# Patient Record
Sex: FEMALE | Race: WHITE | NOT HISPANIC OR LATINO | Employment: OTHER | URBAN - METROPOLITAN AREA
[De-identification: names, ages, dates, MRNs, and addresses within clinical notes are randomized per-mention and may not be internally consistent; named-entity substitution may affect disease eponyms.]

---

## 2017-08-30 ENCOUNTER — HOSPITAL ENCOUNTER (OUTPATIENT)
Facility: MEDICAL CENTER | Age: 82
End: 2017-08-31
Attending: EMERGENCY MEDICINE | Admitting: INTERNAL MEDICINE
Payer: MEDICARE

## 2017-08-30 ENCOUNTER — OFFICE VISIT (OUTPATIENT)
Dept: URGENT CARE | Facility: CLINIC | Age: 82
End: 2017-08-30
Payer: MEDICARE

## 2017-08-30 ENCOUNTER — APPOINTMENT (OUTPATIENT)
Dept: RADIOLOGY | Facility: MEDICAL CENTER | Age: 82
End: 2017-08-30
Attending: EMERGENCY MEDICINE
Payer: MEDICARE

## 2017-08-30 ENCOUNTER — RESOLUTE PROFESSIONAL BILLING HOSPITAL PROF FEE (OUTPATIENT)
Dept: HOSPITALIST | Facility: MEDICAL CENTER | Age: 82
End: 2017-08-30
Payer: MEDICARE

## 2017-08-30 VITALS
DIASTOLIC BLOOD PRESSURE: 84 MMHG | RESPIRATION RATE: 18 BRPM | TEMPERATURE: 98.6 F | OXYGEN SATURATION: 98 % | WEIGHT: 143 LBS | SYSTOLIC BLOOD PRESSURE: 162 MMHG | BODY MASS INDEX: 25.33 KG/M2 | HEART RATE: 76 BPM

## 2017-08-30 DIAGNOSIS — R07.89 CHEST DISCOMFORT: ICD-10-CM

## 2017-08-30 PROBLEM — R07.9 CHEST PAIN: Status: ACTIVE | Noted: 2017-08-30

## 2017-08-30 LAB
ALBUMIN SERPL BCP-MCNC: 4.3 G/DL (ref 3.2–4.9)
ALBUMIN/GLOB SERPL: 1.5 G/DL
ALP SERPL-CCNC: 64 U/L (ref 30–99)
ALT SERPL-CCNC: 17 U/L (ref 2–50)
ANION GAP SERPL CALC-SCNC: 9 MMOL/L (ref 0–11.9)
APTT PPP: 30.5 SEC (ref 24.7–36)
AST SERPL-CCNC: 33 U/L (ref 12–45)
BASOPHILS # BLD AUTO: 0.4 % (ref 0–1.8)
BASOPHILS # BLD: 0.05 K/UL (ref 0–0.12)
BILIRUB SERPL-MCNC: 0.4 MG/DL (ref 0.1–1.5)
BNP SERPL-MCNC: 101 PG/ML (ref 0–100)
BUN SERPL-MCNC: 20 MG/DL (ref 8–22)
CALCIUM SERPL-MCNC: 9.6 MG/DL (ref 8.5–10.5)
CHLORIDE SERPL-SCNC: 109 MMOL/L (ref 96–112)
CO2 SERPL-SCNC: 24 MMOL/L (ref 20–33)
CREAT SERPL-MCNC: 0.95 MG/DL (ref 0.5–1.4)
EKG IMPRESSION: NORMAL
EOSINOPHIL # BLD AUTO: 0.22 K/UL (ref 0–0.51)
EOSINOPHIL NFR BLD: 2 % (ref 0–6.9)
ERYTHROCYTE [DISTWIDTH] IN BLOOD BY AUTOMATED COUNT: 43.7 FL (ref 35.9–50)
GFR SERPL CREATININE-BSD FRML MDRD: 57 ML/MIN/1.73 M 2
GLOBULIN SER CALC-MCNC: 2.9 G/DL (ref 1.9–3.5)
GLUCOSE SERPL-MCNC: 104 MG/DL (ref 65–99)
HCT VFR BLD AUTO: 46.3 % (ref 37–47)
HGB BLD-MCNC: 15.8 G/DL (ref 12–16)
IMM GRANULOCYTES # BLD AUTO: 0.04 K/UL (ref 0–0.11)
IMM GRANULOCYTES NFR BLD AUTO: 0.4 % (ref 0–0.9)
INR PPP: 1 (ref 0.87–1.13)
LIPASE SERPL-CCNC: 365 U/L (ref 11–82)
LYMPHOCYTES # BLD AUTO: 2.89 K/UL (ref 1–4.8)
LYMPHOCYTES NFR BLD: 25.8 % (ref 22–41)
MCH RBC QN AUTO: 31.2 PG (ref 27–33)
MCHC RBC AUTO-ENTMCNC: 34.1 G/DL (ref 33.6–35)
MCV RBC AUTO: 91.3 FL (ref 81.4–97.8)
MONOCYTES # BLD AUTO: 0.9 K/UL (ref 0–0.85)
MONOCYTES NFR BLD AUTO: 8.1 % (ref 0–13.4)
NEUTROPHILS # BLD AUTO: 7.08 K/UL (ref 2–7.15)
NEUTROPHILS NFR BLD: 63.3 % (ref 44–72)
NRBC # BLD AUTO: 0 K/UL
NRBC BLD AUTO-RTO: 0 /100 WBC
PLATELET # BLD AUTO: 196 K/UL (ref 164–446)
PMV BLD AUTO: 9.2 FL (ref 9–12.9)
POTASSIUM SERPL-SCNC: 4.1 MMOL/L (ref 3.6–5.5)
PROT SERPL-MCNC: 7.2 G/DL (ref 6–8.2)
PROTHROMBIN TIME: 13.5 SEC (ref 12–14.6)
RBC # BLD AUTO: 5.07 M/UL (ref 4.2–5.4)
SODIUM SERPL-SCNC: 142 MMOL/L (ref 135–145)
TROPONIN I SERPL-MCNC: <0.01 NG/ML (ref 0–0.04)
WBC # BLD AUTO: 11.2 K/UL (ref 4.8–10.8)

## 2017-08-30 PROCEDURE — 71010 DX-CHEST-LIMITED (1 VIEW): CPT

## 2017-08-30 PROCEDURE — 93005 ELECTROCARDIOGRAM TRACING: CPT | Performed by: EMERGENCY MEDICINE

## 2017-08-30 PROCEDURE — 85025 COMPLETE CBC W/AUTO DIFF WBC: CPT

## 2017-08-30 PROCEDURE — 85610 PROTHROMBIN TIME: CPT

## 2017-08-30 PROCEDURE — 36415 COLL VENOUS BLD VENIPUNCTURE: CPT

## 2017-08-30 PROCEDURE — 93005 ELECTROCARDIOGRAM TRACING: CPT

## 2017-08-30 PROCEDURE — 83880 ASSAY OF NATRIURETIC PEPTIDE: CPT

## 2017-08-30 PROCEDURE — 99285 EMERGENCY DEPT VISIT HI MDM: CPT

## 2017-08-30 PROCEDURE — 85730 THROMBOPLASTIN TIME PARTIAL: CPT

## 2017-08-30 PROCEDURE — 99203 OFFICE O/P NEW LOW 30 MIN: CPT | Mod: 25 | Performed by: FAMILY MEDICINE

## 2017-08-30 PROCEDURE — G0378 HOSPITAL OBSERVATION PER HR: HCPCS

## 2017-08-30 PROCEDURE — 84484 ASSAY OF TROPONIN QUANT: CPT

## 2017-08-30 PROCEDURE — 93000 ELECTROCARDIOGRAM COMPLETE: CPT | Performed by: FAMILY MEDICINE

## 2017-08-30 PROCEDURE — 83690 ASSAY OF LIPASE: CPT

## 2017-08-30 PROCEDURE — 99219 PR INITIAL OBSERVATION CARE,LEVL II: CPT | Mod: GC | Performed by: INTERNAL MEDICINE

## 2017-08-30 PROCEDURE — 80053 COMPREHEN METABOLIC PANEL: CPT

## 2017-08-30 RX ORDER — SODIUM CHLORIDE 9 MG/ML
INJECTION, SOLUTION INTRAVENOUS CONTINUOUS
Status: DISCONTINUED | OUTPATIENT
Start: 2017-08-31 | End: 2017-08-31 | Stop reason: HOSPADM

## 2017-08-30 RX ORDER — POLYETHYLENE GLYCOL 3350 17 G/17G
1 POWDER, FOR SOLUTION ORAL
Status: DISCONTINUED | OUTPATIENT
Start: 2017-08-30 | End: 2017-08-31 | Stop reason: HOSPADM

## 2017-08-30 RX ORDER — LABETALOL HYDROCHLORIDE 5 MG/ML
10 INJECTION, SOLUTION INTRAVENOUS EVERY 4 HOURS PRN
Status: DISCONTINUED | OUTPATIENT
Start: 2017-08-30 | End: 2017-08-31 | Stop reason: HOSPADM

## 2017-08-30 RX ORDER — ATORVASTATIN CALCIUM 40 MG/1
40 TABLET, FILM COATED ORAL
Status: DISCONTINUED | OUTPATIENT
Start: 2017-08-31 | End: 2017-08-31 | Stop reason: HOSPADM

## 2017-08-30 RX ORDER — CYCLOSPORINE 0.5 MG/ML
EMULSION OPHTHALMIC
COMMUNITY
Start: 2017-07-27 | End: 2017-08-30

## 2017-08-30 RX ORDER — DEXTROSE MONOHYDRATE 25 G/50ML
25 INJECTION, SOLUTION INTRAVENOUS
Status: DISCONTINUED | OUTPATIENT
Start: 2017-08-30 | End: 2017-08-31 | Stop reason: HOSPADM

## 2017-08-30 RX ORDER — BISACODYL 10 MG
10 SUPPOSITORY, RECTAL RECTAL
Status: DISCONTINUED | OUTPATIENT
Start: 2017-08-30 | End: 2017-08-31 | Stop reason: HOSPADM

## 2017-08-30 RX ORDER — ASPIRIN 81 MG/1
324 TABLET, CHEWABLE ORAL ONCE
Status: COMPLETED | OUTPATIENT
Start: 2017-08-30 | End: 2017-08-30

## 2017-08-30 RX ORDER — HEPARIN SODIUM 5000 [USP'U]/ML
5000 INJECTION, SOLUTION INTRAVENOUS; SUBCUTANEOUS EVERY 8 HOURS
Status: DISCONTINUED | OUTPATIENT
Start: 2017-08-31 | End: 2017-08-31 | Stop reason: HOSPADM

## 2017-08-30 RX ORDER — ACETAMINOPHEN 325 MG/1
650 TABLET ORAL EVERY 6 HOURS PRN
Status: DISCONTINUED | OUTPATIENT
Start: 2017-08-30 | End: 2017-08-31 | Stop reason: HOSPADM

## 2017-08-30 RX ORDER — AMOXICILLIN 250 MG
2 CAPSULE ORAL 2 TIMES DAILY
Status: DISCONTINUED | OUTPATIENT
Start: 2017-08-31 | End: 2017-08-31 | Stop reason: HOSPADM

## 2017-08-30 RX ORDER — ATENOLOL 50 MG/1
50 TABLET ORAL
COMMUNITY
End: 2017-10-03 | Stop reason: SDUPTHER

## 2017-08-30 RX ADMIN — ASPIRIN 324 MG: 81 TABLET, CHEWABLE ORAL at 18:04

## 2017-08-30 ASSESSMENT — ENCOUNTER SYMPTOMS
FEVER: 0
FOCAL WEAKNESS: 0
BACK PAIN: 1
DIZZINESS: 0
CHILLS: 0
DIARRHEA: 1

## 2017-08-30 ASSESSMENT — PAIN SCALES - GENERAL: PAINLEVEL_OUTOF10: 1

## 2017-08-30 ASSESSMENT — LIFESTYLE VARIABLES: DO YOU DRINK ALCOHOL: NO

## 2017-08-31 ENCOUNTER — APPOINTMENT (OUTPATIENT)
Dept: RADIOLOGY | Facility: MEDICAL CENTER | Age: 82
End: 2017-08-31
Attending: STUDENT IN AN ORGANIZED HEALTH CARE EDUCATION/TRAINING PROGRAM
Payer: MEDICARE

## 2017-08-31 VITALS
HEART RATE: 62 BPM | BODY MASS INDEX: 25.41 KG/M2 | SYSTOLIC BLOOD PRESSURE: 166 MMHG | DIASTOLIC BLOOD PRESSURE: 75 MMHG | HEIGHT: 64 IN | WEIGHT: 148.81 LBS | OXYGEN SATURATION: 96 % | RESPIRATION RATE: 17 BRPM | TEMPERATURE: 98.4 F

## 2017-08-31 PROBLEM — R19.7 DIARRHEA: Status: ACTIVE | Noted: 2017-08-31

## 2017-08-31 PROBLEM — R74.8 ELEVATED LIPASE: Status: ACTIVE | Noted: 2017-08-31

## 2017-08-31 PROBLEM — N18.9 CHRONIC KIDNEY DISEASE: Status: ACTIVE | Noted: 2017-08-31

## 2017-08-31 LAB
ANION GAP SERPL CALC-SCNC: 9 MMOL/L (ref 0–11.9)
BUN SERPL-MCNC: 20 MG/DL (ref 8–22)
CALCIUM SERPL-MCNC: 9.4 MG/DL (ref 8.5–10.5)
CHLORIDE SERPL-SCNC: 108 MMOL/L (ref 96–112)
CHOLEST SERPL-MCNC: 192 MG/DL (ref 100–199)
CO2 SERPL-SCNC: 23 MMOL/L (ref 20–33)
CREAT SERPL-MCNC: 0.91 MG/DL (ref 0.5–1.4)
EKG IMPRESSION: NORMAL
EST. AVERAGE GLUCOSE BLD GHB EST-MCNC: 120 MG/DL
GFR SERPL CREATININE-BSD FRML MDRD: 59 ML/MIN/1.73 M 2
GLUCOSE SERPL-MCNC: 103 MG/DL (ref 65–99)
HBA1C MFR BLD: 5.8 % (ref 0–5.6)
HDLC SERPL-MCNC: 46 MG/DL
LDLC SERPL CALC-MCNC: 116 MG/DL
LIPASE SERPL-CCNC: 68 U/L (ref 11–82)
MAGNESIUM SERPL-MCNC: 1.9 MG/DL (ref 1.5–2.5)
POTASSIUM SERPL-SCNC: 4.1 MMOL/L (ref 3.6–5.5)
SODIUM SERPL-SCNC: 140 MMOL/L (ref 135–145)
TRIGL SERPL-MCNC: 152 MG/DL (ref 0–149)
TROPONIN I SERPL-MCNC: <0.01 NG/ML (ref 0–0.04)
TROPONIN I SERPL-MCNC: <0.01 NG/ML (ref 0–0.04)
TSH SERPL DL<=0.005 MIU/L-ACNC: 3.55 UIU/ML (ref 0.3–3.7)

## 2017-08-31 PROCEDURE — G0378 HOSPITAL OBSERVATION PER HR: HCPCS

## 2017-08-31 PROCEDURE — 93005 ELECTROCARDIOGRAM TRACING: CPT | Performed by: INTERNAL MEDICINE

## 2017-08-31 PROCEDURE — 700105 HCHG RX REV CODE 258: Performed by: STUDENT IN AN ORGANIZED HEALTH CARE EDUCATION/TRAINING PROGRAM

## 2017-08-31 PROCEDURE — 84443 ASSAY THYROID STIM HORMONE: CPT

## 2017-08-31 PROCEDURE — 83735 ASSAY OF MAGNESIUM: CPT

## 2017-08-31 PROCEDURE — 700102 HCHG RX REV CODE 250 W/ 637 OVERRIDE(OP): Performed by: INTERNAL MEDICINE

## 2017-08-31 PROCEDURE — 84484 ASSAY OF TROPONIN QUANT: CPT

## 2017-08-31 PROCEDURE — 36415 COLL VENOUS BLD VENIPUNCTURE: CPT

## 2017-08-31 PROCEDURE — A9270 NON-COVERED ITEM OR SERVICE: HCPCS | Performed by: STUDENT IN AN ORGANIZED HEALTH CARE EDUCATION/TRAINING PROGRAM

## 2017-08-31 PROCEDURE — A9270 NON-COVERED ITEM OR SERVICE: HCPCS | Performed by: INTERNAL MEDICINE

## 2017-08-31 PROCEDURE — 700111 HCHG RX REV CODE 636 W/ 250 OVERRIDE (IP)

## 2017-08-31 PROCEDURE — 700111 HCHG RX REV CODE 636 W/ 250 OVERRIDE (IP): Performed by: STUDENT IN AN ORGANIZED HEALTH CARE EDUCATION/TRAINING PROGRAM

## 2017-08-31 PROCEDURE — 99217 PR OBSERVATION CARE DISCHARGE: CPT | Mod: GC | Performed by: INTERNAL MEDICINE

## 2017-08-31 PROCEDURE — 93010 ELECTROCARDIOGRAM REPORT: CPT | Performed by: INTERNAL MEDICINE

## 2017-08-31 PROCEDURE — 80061 LIPID PANEL: CPT

## 2017-08-31 PROCEDURE — A9502 TC99M TETROFOSMIN: HCPCS

## 2017-08-31 PROCEDURE — 93005 ELECTROCARDIOGRAM TRACING: CPT | Performed by: STUDENT IN AN ORGANIZED HEALTH CARE EDUCATION/TRAINING PROGRAM

## 2017-08-31 PROCEDURE — 83690 ASSAY OF LIPASE: CPT

## 2017-08-31 PROCEDURE — 83036 HEMOGLOBIN GLYCOSYLATED A1C: CPT

## 2017-08-31 PROCEDURE — 80048 BASIC METABOLIC PNL TOTAL CA: CPT

## 2017-08-31 PROCEDURE — 94760 N-INVAS EAR/PLS OXIMETRY 1: CPT

## 2017-08-31 PROCEDURE — 96372 THER/PROPH/DIAG INJ SC/IM: CPT | Mod: XU

## 2017-08-31 PROCEDURE — 700102 HCHG RX REV CODE 250 W/ 637 OVERRIDE(OP): Performed by: STUDENT IN AN ORGANIZED HEALTH CARE EDUCATION/TRAINING PROGRAM

## 2017-08-31 RX ORDER — REGADENOSON 0.08 MG/ML
INJECTION, SOLUTION INTRAVENOUS
Status: COMPLETED
Start: 2017-08-31 | End: 2017-08-31

## 2017-08-31 RX ORDER — CEPHALEXIN 500 MG/1
500 CAPSULE ORAL DAILY
Status: DISCONTINUED | OUTPATIENT
Start: 2017-08-31 | End: 2017-08-31 | Stop reason: HOSPADM

## 2017-08-31 RX ORDER — OMEPRAZOLE 20 MG/1
20 CAPSULE, DELAYED RELEASE ORAL
Status: DISCONTINUED | OUTPATIENT
Start: 2017-08-31 | End: 2017-08-31 | Stop reason: HOSPADM

## 2017-08-31 RX ORDER — ATORVASTATIN CALCIUM 40 MG/1
40 TABLET, FILM COATED ORAL
Qty: 30 TAB | Refills: 0 | Status: SHIPPED | OUTPATIENT
Start: 2017-08-31 | End: 2017-09-06

## 2017-08-31 RX ORDER — ATENOLOL 50 MG/1
50 TABLET ORAL
Status: DISCONTINUED | OUTPATIENT
Start: 2017-08-31 | End: 2017-08-31 | Stop reason: HOSPADM

## 2017-08-31 RX ORDER — ASPIRIN 81 MG/1
81 TABLET ORAL DAILY
Qty: 30 TAB | Refills: 0 | Status: SHIPPED | OUTPATIENT
Start: 2017-08-31 | End: 2023-07-10

## 2017-08-31 RX ORDER — L. ACIDOPHILUS/L.BULGARICUS 100MM CELL
1 GRANULES IN PACKET (EA) ORAL
Status: DISCONTINUED | OUTPATIENT
Start: 2017-08-31 | End: 2017-08-31 | Stop reason: HOSPADM

## 2017-08-31 RX ADMIN — HEPARIN SODIUM 5000 UNITS: 5000 INJECTION, SOLUTION INTRAVENOUS; SUBCUTANEOUS at 06:33

## 2017-08-31 RX ADMIN — SODIUM CHLORIDE: 9 INJECTION, SOLUTION INTRAVENOUS at 02:44

## 2017-08-31 RX ADMIN — REGADENOSON 0.4 MG: 0.08 INJECTION, SOLUTION INTRAVENOUS at 09:24

## 2017-08-31 RX ADMIN — HEPARIN SODIUM 5000 UNITS: 5000 INJECTION, SOLUTION INTRAVENOUS; SUBCUTANEOUS at 03:06

## 2017-08-31 RX ADMIN — ATORVASTATIN CALCIUM 40 MG: 40 TABLET, FILM COATED ORAL at 03:06

## 2017-08-31 RX ADMIN — ATENOLOL 50 MG: 50 TABLET ORAL at 03:06

## 2017-08-31 RX ADMIN — CEPHALEXIN 500 MG: 500 CAPSULE ORAL at 10:50

## 2017-08-31 RX ADMIN — OMEPRAZOLE 20 MG: 20 CAPSULE, DELAYED RELEASE ORAL at 06:32

## 2017-08-31 ASSESSMENT — ENCOUNTER SYMPTOMS
FEVER: 0
DIAPHORESIS: 0
HEARTBURN: 0
PND: 0
FOCAL WEAKNESS: 0
SHORTNESS OF BREATH: 0
CHILLS: 0
NAUSEA: 0
ORTHOPNEA: 0
BLOOD IN STOOL: 0
CONSTIPATION: 0
ABDOMINAL PAIN: 0
VOMITING: 0
HEADACHES: 0
WEIGHT LOSS: 1
BACK PAIN: 1
TREMORS: 0
TINGLING: 0
NECK PAIN: 1
SORE THROAT: 0
WHEEZING: 0
BLURRED VISION: 0
NERVOUS/ANXIOUS: 0
COUGH: 0
DIARRHEA: 0
BRUISES/BLEEDS EASILY: 0
DIZZINESS: 0
WEAKNESS: 0
PALPITATIONS: 0

## 2017-08-31 ASSESSMENT — LIFESTYLE VARIABLES
EVER HAD A DRINK FIRST THING IN THE MORNING TO STEADY YOUR NERVES TO GET RID OF A HANGOVER: NO
HOW MANY TIMES IN THE PAST YEAR HAVE YOU HAD 5 OR MORE DRINKS IN A DAY: 0
TOTAL SCORE: 0
CONSUMPTION TOTAL: NEGATIVE
AVERAGE NUMBER OF DAYS PER WEEK YOU HAVE A DRINK CONTAINING ALCOHOL: 0
ALCOHOL_USE: YES
TOTAL SCORE: 0
EVER_SMOKED: YES
EVER_SMOKED: YES
TOTAL SCORE: 0
HAVE YOU EVER FELT YOU SHOULD CUT DOWN ON YOUR DRINKING: NO
HAVE PEOPLE ANNOYED YOU BY CRITICIZING YOUR DRINKING: NO
EVER FELT BAD OR GUILTY ABOUT YOUR DRINKING: NO
ON A TYPICAL DAY WHEN YOU DRINK ALCOHOL HOW MANY DRINKS DO YOU HAVE: 1

## 2017-08-31 ASSESSMENT — PAIN SCALES - GENERAL
PAINLEVEL_OUTOF10: 0

## 2017-08-31 ASSESSMENT — COPD QUESTIONNAIRES
DO YOU EVER COUGH UP ANY MUCUS OR PHLEGM?: NO/ONLY WITH OCCASIONAL COLDS OR INFECTIONS
DURING THE PAST 4 WEEKS HOW MUCH DID YOU FEEL SHORT OF BREATH: NONE/LITTLE OF THE TIME
HAVE YOU SMOKED AT LEAST 100 CIGARETTES IN YOUR ENTIRE LIFE: YES

## 2017-08-31 NOTE — ASSESSMENT & PLAN NOTE
Patient had central Non reproduciblechest pain lasted one hour and radiated to her upper back.   Heart score is 3.  Low suspicion of PE.  Risk factors for Cardiac chest Pain, Post menopausal, Hypertensive,Hyperlipidemia.  On admission Normal troponin 0.01  EKG shows non specific ST T wave changes.  Plan:  Observation admit to telemetry.  NPO at Midnight.  Trend troponins and EKG  TSH level ordered  Lipid panel and hemoglobin A1c, TSH ordered.  Aspirin 81mg  Continue Atenolol 10mg   Cardiac Stress test ordered.  Tylenol as needed.  Primary team to consider echo and d-dimer are V/Q scan if needed.

## 2017-08-31 NOTE — PROGRESS NOTES
"Subjective:      William Vidal is a 82 y.o. female who presents with Chest Pain (middle of chest leading to the back X 1 hour ) and Diarrhea (x today )    Chief Complaint   Patient presents with   • Chest Pain     middle of chest leading to the back X 1 hour    • Diarrhea     x today         - This is a very pleasant 82 y.o. female with complaints of chest pressure radiating over shoulders to back that started 1hr ago whilst eating her dinner. No associated SOB/nausea/Diaphoresis. Says she has felt \"not well\" past few days though and today had some explosive diarrhea.           ALLERGIES:  Augmentin; Cipro xr; Imuran [azathioprine]; Macrobid [nitrofurantoin monohydrate macrocrystals]; Pyridium plus [phenazopyridine-butabarb-hyosc]; Sulfa drugs; Trimethoprim; and Zetia [ezetimibe]     PMH:  Past Medical History:   Diagnosis Date   • Arrhythmia     occ skipped beat   • Autoimmune hepatitis (CMS-HCC)     Luis Miguel   • CATARACT     surgical IOL   • Chronic UTI (urinary tract infection)     Raciel on abx suppression   • DJD (degenerative joint disease), lumbosacral     Hood   • Gallbladder problem    • GERD (gastroesophageal reflux disease)    • GOUT    • Heart valve disease    • Hypertension    • Incontinence of urine     Iyer   • Irritable bowel    • Other specified disorder of intestines     diarrhea   • Psychiatric problem     anxiety        MEDS:    Current Outpatient Prescriptions:   •  RESTASIS 0.05 % ophthalmic emulsion, , Disp: , Rfl:   •  cephALEXin (KEFLEX) 500 MG Cap, Take 1 Cap by mouth every day., Disp: 90 Cap, Rfl: 3  •  atenolol (TENORMIN) 50 MG Tab, Take one tablet by mouth every morning and take 1/2 tablet by mouth at bedtime., Disp: 135 Tab, Rfl: 0  •  VITAMIN E PO, Take  by mouth., Disp: , Rfl:   •  Krill Oil 300 MG CAPS, Take  by mouth every day., Disp: , Rfl:   •  coenzyme Q-10 30 MG capsule, Take 60 mg by mouth every day.  , Disp: , Rfl:   •  Probiotic Product (PRO-BIOTIC BLEND) " CAPS, Take  by mouth every day., Disp: , Rfl:   •  levofloxacin (LEVAQUIN) 250 MG Tab, Take 1 Tab by mouth every day., Disp: 5 Tab, Rfl: 0  •  MethylPREDNISolone (MEDROL DOSEPAK) 4 MG Tablet Therapy Pack, Take as directed, Disp: 21 Tab, Rfl: 0  •  tramadol (ULTRAM) 50 MG Tab, Take 1 Tab by mouth every 8 hours as needed for Moderate Pain or Severe Pain (caution drowsy)., Disp: 10 Tab, Rfl: 0  •  doxycycline (VIBRAMYCIN) 100 MG Tab, Take 1 Tab by mouth 2 times a day., Disp: 14 Tab, Rfl: 0  •  cefUROXime (CEFTIN) 500 MG Tab, Take 1 Tab by mouth 2 times a day., Disp: 20 Tab, Rfl: 0  •  oxycodone immediate-release (ROXICODONE) 5 MG TABS, Take 1 Tab by mouth every four hours as needed for Severe Pain., Disp: 30 Tab, Rfl: 0  •  cyclobenzaprine (FLEXERIL) 5 MG tablet, Take 1-2 Tabs by mouth 3 times a day as needed for Moderate Pain or Muscle Spasms., Disp: 30 Tab, Rfl: 0  •  tramadol (ULTRAM) 50 MG TABS, Take 1-2 Tabs by mouth every 6 hours as needed (pain)., Disp: 30 Tab, Rfl: 0  •  esomeprazole magnesium (NEXIUM) 40 MG PACK, Take 40 mg by mouth every morning before breakfast., Disp: , Rfl:   •  estradiol (ESTRACE) 0.1 MG/GM vaginal cream, Insert vaginally qd x 2 weeks then decrease to 2x a week, Disp: 60 g, Rfl: 1  •  loperamide (IMODIUM) 1 MG/5ML LIQD, Take 2 mg by mouth 4 times a day as needed., Disp: , Rfl:   •  Cholecalciferol (VITAMIN D3) 2000 UNIT CAPS, Take  by mouth every day., Disp: , Rfl:   •  ipratropium (ATROVENT) 0.06 % SOLN, Spray 2 Sprays in nose as needed.  , Disp: , Rfl:   •  Loteprednol Etabonate (LOTEMAX) 0.5 % OINT, by Ophthalmic route as needed.  , Disp: , Rfl:     ** I have documented what I find to be significant in regards to past medical, social, family and surgical history  in my HPI or under PMH/PSH/FH review section, otherwise it is contributory **           HPI    Review of Systems   Constitutional: Negative for chills and fever.   Cardiovascular: Positive for chest pain.   Gastrointestinal:  Positive for diarrhea.   Musculoskeletal: Positive for back pain.   Neurological: Negative for dizziness and focal weakness.          Objective:     BP (!) 162/84   Pulse 76   Temp 37 °C (98.6 °F)   Resp 18   Wt 64.9 kg (143 lb)   LMP 10/10/1995   SpO2 98%   BMI 25.33 kg/m²      Physical Exam   Constitutional: She appears well-developed. No distress.   HENT:   Head: Normocephalic and atraumatic.   Eyes: Conjunctivae are normal.   Neck: Neck supple.   Cardiovascular: Regular rhythm.    No murmur heard.  Pulmonary/Chest: Effort normal. No respiratory distress.   Neurological: She is alert. She exhibits normal muscle tone.   Skin: Skin is warm and dry.   Psychiatric: She has a normal mood and affect. Judgment normal.   Nursing note and vitals reviewed.              Assessment/Plan:         1. Chest discomfort  EKG - Clinic performed     EKG: sinus 72, PAC, LAFB    [id number]    Patient advised to go to [facility] ER by POV. Spoke to triage nurse     Reiterated to patient that although a provider to provider transfer was made this will not necessarily expedite the ER process.

## 2017-08-31 NOTE — ASSESSMENT & PLAN NOTE
Three episodes of loose watery stools.  Likely secondary to chronic antibiotic use.  Magnesium Level Ordered.  NS fluids for maintenance.

## 2017-08-31 NOTE — ASSESSMENT & PLAN NOTE
On admission GFR is 57. BUN/creatinine is 20/0.95.  IV fluid therapy to improve kidney function.  Lipase is elevated 365.  Repeat lipase.

## 2017-08-31 NOTE — PROGRESS NOTES
Pt arrived to floor via gurney, ambulated to bed, denies CP, SOB. Oriented pt to room, bed, call light, floor. POC discussed, pt verbalized understanding and agreement. A&Ox4, SR on tele monitor, HR-83. Pt's personal belongings and call light within reach.

## 2017-08-31 NOTE — DISCHARGE PLANNING
Care Transition Team Assessment    Information Source  Orientation : Oriented x 4  Information Given By: Patient  Informant's Name:  (William)  Who is responsible for making decisions for patient? : Patient    Readmission Evaluation  Is this a readmission?: No    Elopement Risk  Legal Hold: No    Interdisciplinary Discharge Planning  Does Admitting Nurse Feel This Could be a Complex Discharge?: No  Primary Care Physician:  (Pascale Vyas)  Lives with - Patient's Self Care Capacity:  ([place] and family)  Support Systems: Family Member(s)  Housing / Facility: [address]  Do You Take your Prescribed Medications Regularly: Yes ([employer])  Able to Return to Previous ADL's: Yes  Mobility Issues: No  Prior Services: None  Patient Expects to be Discharged to::  (Home)  Assistance Needed: Unknown at this Time    Discharge Preparedness  What are your discharge supports?:  (family)  Prior Functional Level: Ambulatory, Independent with Activities of Daily Living    Functional Assesment  Prior Functional Level: Ambulatory, Independent with Activities of Daily Living    Finances  Financial Barriers to Discharge: No  Prescription Coverage: Yes (Medicare/Medicaid)    Vision / Hearing Impairment  Vision Impairment :  (Reading glasses)    Values / Beliefs / Concerns  Values / Beliefs Concerns : No    Advance Directive  Advance Directive?: None  Advance Directive offered?: AD Booklet given    Domestic Abuse  Have you ever been the victim of abuse or violence?: No    Psychological Assessment  History of Substance Abuse: None  History of Psychiatric Problems: No    Discharge Risks or Barriers  Discharge risks or barriers?: No  Patient risk factors: Vulnerable adult (elderly)    Anticipated Discharge Information  Anticipated discharge disposition: Home  Discharge Address:  ([address] [facility])  Discharge Contact Phone Number:  (Bethany (daughter) [phone])

## 2017-08-31 NOTE — ED NOTES
Pt amb to triage. EKG complete.  Chief Complaint   Patient presents with   • Chest Pain     onset 1600 today across front of chest while sitting and eating   • Diarrhea     around noon today, sudden onset     Pt states she took ASA 650mg PO, and feels better now.

## 2017-08-31 NOTE — DISCHARGE PLANNING
Care Transition Team Assessment    Information Source  Orientation : Oriented x 4  Information Given By: Patient  Informant's Name:  (William)  Who is responsible for making decisions for patient? : Patient    Readmission Evaluation  Is this a readmission?: No    Elopement Risk  Legal Hold: No    Interdisciplinary Discharge Planning  Does Admitting Nurse Feel This Could be a Complex Discharge?: No  Primary Care Physician:  (Kb Patrick MD)  Lives with - Patient's Self Care Capacity: Spouse  Support Systems: Family Member(s)  Housing / Facility: [address]  Do You Take your Prescribed Medications Regularly: Yes  Able to Return to Previous ADL's: Yes  Mobility Issues: No  Prior Services: None  Patient Expects to be Discharged to::  (Home)  Assistance Needed: No  Durable Medical Equipment: Not Applicable    Discharge Preparedness  What are your discharge supports?: Spouse  Prior Functional Level: Ambulatory, Drives Self, Independent with Activities of Daily Living    Functional Assesment  Prior Functional Level: Ambulatory, Drives Self, Independent with Activities of Daily Living    Finances  Financial Barriers to Discharge: No  Prescription Coverage: Yes ([facility])    Vision / Hearing Impairment  Vision Impairment :  (glasses)  Hearing Impairment : No  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)  Does Pt Need Special Equipment for the Hearing Impaired?: No    Values / Beliefs / Concerns  Values / Beliefs Concerns : No    Advance Directive  Advance Directive?: None  Advance Directive offered?: AD Booklet given    Domestic Abuse  Have you ever been the victim of abuse or violence?: No    Psychological Assessment  History of Substance Abuse: None  History of Psychiatric Problems: No    Discharge Risks or Barriers  Discharge risks or barriers?: No  Patient risk factors: Vulnerable adult (elderly)    Anticipated Discharge Information  Anticipated discharge disposition: Home  [facility]  [facility]  [facility]  Discharge Contact Phone Number:  (Pat (sister) [phone])

## 2017-08-31 NOTE — DISCHARGE PLANNING
Care Transition Team Assessment    Information Source  Orientation : Oriented x 4  Information Given By: Patient  Informant's Name:  (Jory)  Who is responsible for making decisions for patient? : Patient    Readmission Evaluation  Is this a readmission?: No    Elopement Risk  Legal Hold: No    Interdisciplinary Discharge Planning  Does Admitting Nurse Feel This Could be a Complex Discharge?: No  Primary Care Physician: Kb Patrick  Lives with - Patient's Self Care Capacity:  ()  Support Systems: Family Member(s)  Housing / Facility: [address]  Do You Take your Prescribed Medications Regularly: Yes (Manohar)  Able to Return to Previous ADL's: Yes  Mobility Issues: No  Prior Services: None  Patient Expects to be Discharged to::  (Home)  Assistance Needed: Yes  Durable Medical Equipment: Not Applicable    Discharge Preparedness  What are your discharge supports?:  (family)  Prior Functional Level: Ambulatory, Drives Self, Independent with Activities of Daily Living    Functional Assesment  Prior Functional Level: Ambulatory, Drives Self, Independent with Activities of Daily Living    Finances  Financial Barriers to Discharge: No  Prescription Coverage: Yes    Vision / Hearing Impairment  Vision Impairment :  (glasses)  Hearing Impairment : Yes  Hearing Impairment:  (right ear deaf, left ear hearing aid)  Does Pt Need Special Equipment for the Hearing Impaired?: No    Values / Beliefs / Concerns  Values / Beliefs Concerns : No    Advance Directive  Advance Directive?: None  Advance Directive offered?: AD Booklet given    Domestic Abuse  Have you ever been the victim of abuse or violence?: No    Psychological Assessment  History of Substance Abuse: None  History of Psychiatric Problems: No    Discharge Risks or Barriers  Discharge risks or barriers?: No  Patient risk factors: Vulnerable adult (elderly)    Anticipated Discharge Information  Anticipated discharge disposition:  Home  Discharge Address:  ([address] [place] [address])  Discharge Contact Phone Number:  (Pat (sister) [phone])

## 2017-08-31 NOTE — DISCHARGE INSTRUCTIONS
Discharge Instructions    Discharged to home by car with relative. Discharged via walking, hospital escort: Yes.  Special equipment needed: Not Applicable    Be sure to schedule a follow-up appointment with your primary care doctor or any specialists as instructed.     Discharge Plan:   Diet Plan: Discussed (Regular)  Activity Level: Discussed (As tolerated)  Confirmed Follow up Appointment: Patient to Call and Schedule Appointment  Confirmed Symptoms Management: Discussed  Medication Reconciliation Updated: Yes  Influenza Vaccine Indication: Not indicated: Previously immunized this influenza season and > 8 years of age    I understand that a diet low in cholesterol, fat, and sodium is recommended for good health. Unless I have been given specific instructions below for another diet, I accept this instruction as my diet prescription.   Other diet: Regular    Special Instructions: None    · Is patient discharged on Warfarin / Coumadin?   No     · Is patient Post Blood Transfusion?  No    Depression / Suicide Risk    As you are discharged from this [facility] facility, it is important to learn how to keep safe from harming yourself.    Recognize the warning signs:  · Abrupt changes in personality, positive or negative- including increase in energy   · Giving away possessions  · Change in eating patterns- significant weight changes-  positive or negative  · Change in sleeping patterns- unable to sleep or sleeping all the time   · Unwillingness or inability to communicate  · Depression  · Unusual sadness, discouragement and loneliness  · Talk of wanting to die  · Neglect of personal appearance   · Rebelliousness- reckless behavior  · Withdrawal from people/activities they love  · Confusion- inability to concentrate     If you or a loved one observes any of these behaviors or has concerns about self-harm, here's what you can do:  · Talk about it- your feelings and reasons for harming yourself  · Remove any means that  you might use to hurt yourself (examples: pills, rope, extension cords, firearm)  · Get professional help from the community (Mental Health, Substance Abuse, psychological counseling)  · Do not be alone:Call your Safe Contact- someone whom you trust who will be there for you.  · Call your local CRISIS HOTLINE 632-9215 or 000-130-8200  · Call your local Children's Mobile Crisis Response Team [place] (131) 800-9989 or www.[employer]  · Call the toll free National Suicide Prevention Hotlines   · National Suicide Prevention Lifeline 709-632-CCXG (0596)  · National Hope Line Network 800-SUICIDE (190-8890)

## 2017-08-31 NOTE — DISCHARGE SUMMARY
"        Internal Medicine Discharge Summary      Admit Date:  8/30/2017       Discharge Date:   8/31/17    Service:   [facility] Internal Medicine White Team  Attending Physician(s):   Galileo       Senior Resident(s):   Gaurang  Sheldon Resident(s):   Graham      Primary Diagnosis:   Atypical chest pain    Secondary Diagnoses:                Active Problems:    GERD (gastroesophageal reflux disease) POA: Yes    Chest pain POA: Yes    Elevated lipase POA: Yes    Chronic kidney disease POA: Yes    Diarrhea POA: Yes  Resolved Problems:    * No resolved hospital problems. *      Hospital Summary (Brief Narrative):       The patient was admitted for atypical chest pain.  She was found to have negative troponins x3 and ECG showed no signs of ischemic heart disease.  Her pain resolved prior to admission with ASA and she remained asymptomatic her entire hospital course. CXR was unremarkable. Admitting provider spoke with cardiology who recommended a MPI.  She had a MPI performed that demonstrated \"No evidence of significant jeopardized viable myocardium or prior myocardial    infarction. Normal left ventricular size, ejection fraction, and wall motion.\"  The patient had an elevated lipase to 365 however a f/u was 68 after 1L IVF. She had no episodes of loose stool, was tolerating PO intake, and discharged in stable condition on 81mg ASA daily and 40mg Atorvustatin.  Her pressures were elevated to 160s during this stay. We will continue home BP meds and consider pcp for further dose adjustment.        Patient /Hospital Summary (Details -- Problem Oriented) :          Diarrhea   Assessment & Plan    Patient is at risk for Cdiff with chronic antibiotic therapy. She is currently afebrile, no abdominal pain. 2 previous episodes of loose stool however no diarrhea while in hospital.   PCP to follow      Chronic kidney disease   Assessment & Plan    On admission GFR is 57. BUN/creatinine is 20/0.95.  IV fluid therapy to improve kidney " function.  Avoid nephrotoxins        Chest pain   Assessment & Plan    Patient had central Non reproducible chest pain lasted one hour and radiated to her upper back.   Heart score is 3.  Low suspicion of PE.  Risk factors for Cardiac chest Pain, Post menopausal, Hypertensive,Hyperlipidemia.  On admission Normal troponin 0.01  EKG shows non specific ST T wave changes.  Plan:  Observation admit to telemetry.  NPO at Midnight.  Trend troponins and EKG  TSH level ordered  Lipid panel and hemoglobin A1c, TSH ordered.  Aspirin 81mg  Continue Atenolol 10mg   Cardiac Stress test ordered.  Tylenol as needed.        GERD (gastroesophageal reflux disease)   Assessment & Plan    Resume home on omeprazole 40 mg.            Consultants:     None    Procedures:        Myocardial perfusion imaging    Imaging/ Testing:      NM-CARDIAC STRESS TEST   Final Result      DX-CHEST-LIMITED (1 VIEW)   Final Result      No acute cardiopulmonary disease.            Discharge Medications:         Medication Reconciliation: Completed       Medication List      START taking these medications      Instructions   aspirin 81 MG EC tablet   Take 1 Tab by mouth every day.  Dose:  81 mg     atorvastatin 40 MG Tabs  Commonly known as:  LIPITOR   Take 1 Tab by mouth every bedtime.  Dose:  40 mg        CONTINUE taking these medications      Instructions   atenolol 50 MG Tabs  Commonly known as:  TENORMIN   Take 50 mg by mouth every bedtime.  Dose:  50 mg     cephALEXin 500 MG Caps  Commonly known as:  KEFLEX   Take 1 Cap by mouth every day.  Dose:  500 mg     multivitamin Tabs   Take 1 Tab by mouth every day.  Dose:  1 Tab     NEXIUM 40 MG Pack  Generic drug:  esomeprazole magnesium   Take 40 mg by mouth every morning before breakfast.  Dose:  40 mg     PROBIOTIC ADVANCED PO   Take  by mouth.     RESTASIS OP   1 Drop by Ophthalmic route 2 Times a Day.  Dose:  1 Drop            Can use .DISCHARGEMEDSLIST if going to another facility         Disposition:    Discharge home    Diet:   healthy    Activity:   As tolerated    Instructions:      Return to ED if continued Diarrhea or return of chest pain, fevers, nausea, vomiting, or abdominal pain.   The patient was instructed to return to the ER in the event of worsening symptoms. I have counseled the patient on the importance of compliance and the patient has agreed to proceed with all medical recommendations and follow up plan indicated above.   The patient understands that all medications come with benefits and risks. Risks may include permanent injury or death and these risks can be minimized with close reassessment and monitoring.        Primary Care Provider:    Kb Patrick M.D.    Discharge summary faxed to primary care provider:  Deferred  Copy of discharge summary given to the patient: Deferred      Follow up appointment details :      Follow up with PCP in next two weeks    Pending Studies:        None    Time spent on discharge day patient visit, preparing discharge paperwork and arranging for patient follow up.    Summary of follow up issues:   Diarrhea- may be secondary to chronic antibiotic use. She is at increased risk for C-diff. Consider discontinuation of antibiotics if patient tolerates urinary symptoms.     Chest pain- Heart is unlikely to be source of chest pain during this admission. We started her on ASA and Statin.       Discharge Time (Minutes) :    45        Condition on Discharge    ______________________________________________________________________    Interval history/exam for day of discharge:     the patient feels well today. She denies any pain, no diarrhea, no nausea or vomiting.  No dysuria. She feels stressed lately due to her 's recent MI.  No abdominal pain, sob, or chest pain.     Vitals:    08/31/17 0201 08/31/17 0212 08/31/17 0216 08/31/17 0419   BP:  (!) 184/74 (!) 167/72 (!) 166/75   Pulse: 64 72 67 62   Resp: (!) 24 15  17   Temp:  36.6 °C (97.8 °F)  36.9 °C (98.4  "°F)   SpO2: 95% 96%  96%   Weight:  67.5 kg (148 lb 13 oz)     Height:  1.626 m (5' 4\")       Weight/BMI: Body mass index is 25.54 kg/m².  Pulse Oximetry: 96 %, O2 (LPM): 0, O2 Delivery: None (Room Air)    General: well nourished in no acute distressed  CVS: RRR no murmurs clicks or rubs, no carotid bruits, radial pulses equal bilaterally 2+.   PULM: CTAB no wheezing or crackles  ABD: non tender to palpation, soft, non distended  EXT: no pitting edema    Most Recent Labs:    Lab Results   Component Value Date/Time    WBC 11.2 (H) 08/30/2017 08:05 PM    RBC 5.07 08/30/2017 08:05 PM    HEMOGLOBIN 15.8 08/30/2017 08:05 PM    HEMATOCRIT 46.3 08/30/2017 08:05 PM    MCV 91.3 08/30/2017 08:05 PM    MCH 31.2 08/30/2017 08:05 PM    MCHC 34.1 08/30/2017 08:05 PM    MPV 9.2 08/30/2017 08:05 PM    NEUTSPOLYS 63.30 08/30/2017 08:05 PM    LYMPHOCYTES 25.80 08/30/2017 08:05 PM    MONOCYTES 8.10 08/30/2017 08:05 PM    EOSINOPHILS 2.00 08/30/2017 08:05 PM    BASOPHILS 0.40 08/30/2017 08:05 PM      Lab Results   Component Value Date/Time    SODIUM 140 08/31/2017 02:40 AM    POTASSIUM 4.1 08/31/2017 02:40 AM    CHLORIDE 108 08/31/2017 02:40 AM    CO2 23 08/31/2017 02:40 AM    GLUCOSE 103 (H) 08/31/2017 02:40 AM    BUN 20 08/31/2017 02:40 AM    CREATININE 0.91 08/31/2017 02:40 AM      Lab Results   Component Value Date/Time    ALTSGPT 17 08/30/2017 08:05 PM    ASTSGOT 33 08/30/2017 08:05 PM    ALKPHOSPHAT 64 08/30/2017 08:05 PM    TBILIRUBIN 0.4 08/30/2017 08:05 PM    LIPASE 68 08/31/2017 02:40 AM    ALBUMIN 4.3 08/30/2017 08:05 PM    GLOBULIN 2.9 08/30/2017 08:05 PM    INR 1.00 08/30/2017 08:05 PM     Lab Results   Component Value Date/Time    PROTHROMBTM 13.5 08/30/2017 08:05 PM    INR 1.00 08/30/2017 08:05 PM      "

## 2017-08-31 NOTE — ED NOTES
Pt ambulated to bathroom with stand-by assist, gait steady. Pt back to room, sitting in chair. Aware of POC, awaiting transport.

## 2017-08-31 NOTE — ED PROVIDER NOTES
"ED Provider Note    Scribed for Sagar Wilkinson M.D. by Edward Hernandez. 8/30/2017, 8:38 PM.    Primary care provider: Kb Patrick M.D.  Means of arrival: Walk-In  History obtained from: Patient  History limited by: None    CHIEF COMPLAINT  Chief Complaint   Patient presents with   • Chest Pain     onset 1600 today across front of chest while sitting and eating   • Diarrhea     around noon today, sudden onset     HPI  William Vidal is a 82 y.o. female who presents to the Emergency Department complaining of a sudden, constant, chest pain with radiation to upper back onset around 5:00 PM, 4 hours prior to being seen. The patient got up to go to the bathroom and the pain became worse. She went to Urgent Care and was given an aspirin which resolved her chest pain an hour after onset. Prior to ER arrival, patient took another dose. Currently, the patient feels fine but describes a \"tightness\" to her chest but nowhere near the pain prior to the aspirin. Earlier, patient said she was having \"explosive diarrhea\" that started around 2-3 hours before the chest pain. Denies abdominal pain, nausea, vomiting, diaphoresis. The patient is taking her blood pressure medication twice a day due to high blood pressure and is seeing her doctor for a follow up tomorrow. Patient said she's been stressed recently due to her  have an MI 3 weeks ago. She has a history of hypertension, hyperlipidemia. Denies history of heart disease, diabetes.    REVIEW OF SYSTEMS  Positive chest pain, upper back pain, diarrhea. Patient denies fever, vision change, sore throat, shortness of breath, nausea, dysuria, rashes, neurologic deficits, abdominal pain, vomiting, diaphoresis.    C. All other systems reviewed and negative.    PAST MEDICAL HISTORY   has a past medical history of Arrhythmia; Autoimmune hepatitis (CMS-HCC); CATARACT; Chronic UTI (urinary tract infection); DJD (degenerative joint disease), lumbosacral; Gallbladder " "problem; GERD (gastroesophageal reflux disease); GOUT; Heart valve disease; Hypertension; Incontinence of urine; Irritable bowel; Other specified disorder of intestines; and Psychiatric problem.    SURGICAL HISTORY   has a past surgical history that includes appendectomy (1942); abdominal hysterectomy total (7/81); bladder suspension (2008); cholecystectomy; finger amputation; shoulder decompression arthroscopic; and dilation and curettage (5/7/2013).    SOCIAL HISTORY  Social History   Substance Use Topics   • Smoking status: Former Smoker     Quit date: 3/14/1983   • Smokeless tobacco: Never Used   • Alcohol use 0.0 oz/week      Comment: 1 or 2 a month      History   Drug Use No     FAMILY HISTORY  Family History   Problem Relation Age of Onset   • Heart Attack     • Stroke     • Asthma     • Clotting Disorder     • Cancer       CURRENT MEDICATIONS  Reviewed.  See Encounter Summary.     ALLERGIES  Allergies   Allergen Reactions   • Augmentin    • Cipro Xr    • Imuran [Azathioprine]    • Macrobid [Nitrofurantoin Monohydrate Macrocrystals]    • Pyridium Plus [Phenazopyridine-Butabarb-Hyosc]    • Sulfa Drugs    • Trimethoprim    • Zetia [Ezetimibe]      PHYSICAL EXAM  VITAL SIGNS: /68   Pulse 62   Temp 36.4 °C (97.5 °F)   Resp 14   Ht 1.626 m (5' 4\")   Wt 66 kg (145 lb 8.1 oz)   LMP 10/10/1995   SpO2 97%   BMI 24.98 kg/m²    Pulse ox interpretation: I interpret this pulse ox as normal.  Constitutional: Alert in no apparent distress.  HENT: Head normocephalic, atraumatic, Bilateral external ears normal, Nose normal.  Eyes: Pupils are equal, extraocular movements intact, Conjunctiva normal, Non-icteric.   Neck: Normal range of motion, Supple, No stridor.   Lymphatic: No lymphadenopathy noted.   Cardiovascular: Regular rate and rhythm. No rubs, murmurs, or gallops.  Thorax & Lungs: No acute respiratory distress, No wheezing, No increased work of breathing. Clear to ausculation bilaterally.  Abdomen: Soft, " Non tender, Non-distended, No pulsatile masses, No peritoneal signs.  Skin: Warm, Dry, No erythema, No rash.   Back: No bony tenderness, No CVA tenderness.   Extremities: Intact distal pulses, No edema, No tenderness, No cyanosis.    Musculoskeletal: Good range of motion in all major joints. No tenderness to palpation or major deformities noted.   Neurologic: Alert , Normal motor function, Normal sensory function, No focal deficits noted.   Psychiatric: Affect normal, Judgment normal, Mood normal.     DIAGNOSTIC STUDIES / PROCEDURES     LABS  Results for orders placed or performed during the hospital encounter of 08/30/17   Troponin   Result Value Ref Range    Troponin I <0.01 0.00 - 0.04 ng/mL   Btype Natriuretic Peptide   Result Value Ref Range    B Natriuretic Peptide 101 (H) 0 - 100 pg/mL   CBC with Differential   Result Value Ref Range    WBC 11.2 (H) 4.8 - 10.8 K/uL    RBC 5.07 4.20 - 5.40 M/uL    Hemoglobin 15.8 12.0 - 16.0 g/dL    Hematocrit 46.3 37.0 - 47.0 %    MCV 91.3 81.4 - 97.8 fL    MCH 31.2 27.0 - 33.0 pg    MCHC 34.1 33.6 - 35.0 g/dL    RDW 43.7 35.9 - 50.0 fL    Platelet Count 196 164 - 446 K/uL    MPV 9.2 9.0 - 12.9 fL    Neutrophils-Polys 63.30 44.00 - 72.00 %    Lymphocytes 25.80 22.00 - 41.00 %    Monocytes 8.10 0.00 - 13.40 %    Eosinophils 2.00 0.00 - 6.90 %    Basophils 0.40 0.00 - 1.80 %    Immature Granulocytes 0.40 0.00 - 0.90 %    Nucleated RBC 0.00 /100 WBC    Neutrophils (Absolute) 7.08 2.00 - 7.15 K/uL    Lymphs (Absolute) 2.89 1.00 - 4.80 K/uL    Monos (Absolute) 0.90 (H) 0.00 - 0.85 K/uL    Eos (Absolute) 0.22 0.00 - 0.51 K/uL    Baso (Absolute) 0.05 0.00 - 0.12 K/uL    Immature Granulocytes (abs) 0.04 0.00 - 0.11 K/uL    NRBC (Absolute) 0.00 K/uL   Complete Metabolic Panel (CMP)   Result Value Ref Range    Sodium 142 135 - 145 mmol/L    Potassium 4.1 3.6 - 5.5 mmol/L    Chloride 109 96 - 112 mmol/L    Co2 24 20 - 33 mmol/L    Anion Gap 9.0 0.0 - 11.9    Glucose 104 (H) 65 - 99  mg/dL    Bun 20 8 - 22 mg/dL    Creatinine 0.95 0.50 - 1.40 mg/dL    Calcium 9.6 8.5 - 10.5 mg/dL    AST(SGOT) 33 12 - 45 U/L    ALT(SGPT) 17 2 - 50 U/L    Alkaline Phosphatase 64 30 - 99 U/L    Total Bilirubin 0.4 0.1 - 1.5 mg/dL    Albumin 4.3 3.2 - 4.9 g/dL    Total Protein 7.2 6.0 - 8.2 g/dL    Globulin 2.9 1.9 - 3.5 g/dL    A-G Ratio 1.5 g/dL   Prothrombin Time   Result Value Ref Range    PT 13.5 12.0 - 14.6 sec    INR 1.00 0.87 - 1.13   APTT   Result Value Ref Range    APTT 30.5 24.7 - 36.0 sec   Lipase   Result Value Ref Range    Lipase 365 (H) 11 - 82 U/L   ESTIMATED GFR   Result Value Ref Range    GFR If African American >60 >60 mL/min/1.73 m 2    GFR If Non  57 (A) >60 mL/min/1.73 m 2   EKG (ER)   Result Value Ref Range    Report       [facility] Emergency Dept.    Test Date:  2017  Pt Name:    IVA MCCORMICK                Department: ER  MRN:        [id number]                      Room:  Gender:     F                            Technician: [id number]  :        [id number]                   Requested By:ER TRIAGE PROTOCOL  Order #:    [id number]                    Reading MD:    Measurements  Intervals                                Axis  Rate:       61                           P:          50  AK:         204                          QRS:        -46  QRSD:       100                          T:          75  QT:         428  QTc:        431    Interpretive Statements  SINUS RHYTHM  LEFT ANTERIOR FASCICULAR BLOCK  No previous ECG available for comparison       All labs were reviewed by me.    EKG  12 Lead EKG interpreted by me to show:  Normal sinus rhythm  Rate 61  1st Degree AV Block  AK Interval or 204 otherwise normal intervals  No ST-T wave changes suggestive of ischemia  Impression: 1st Degree AV Block with AK interval of 204    RADIOLOGY  DX-CHEST-LIMITED (1 VIEW)   Final Result      No acute cardiopulmonary disease.        The radiologist's interpretation of all  radiological studies have been reviewed by me.    COURSE & MEDICAL DECISION MAKING  Pertinent Labs & Imaging studies reviewed. (See chart for details)    8:38 PM - Patient seen and examined at bedside. Ordered DX-Chest, Lipase, APTT, Prothrombin Time, CMP, CBC, BNP, Troponin, Estimated GFR to evaluate her symptoms.     9:00 PM - I explained that the patient should be admitted for a stress test to ensure the patient has proper evaluation. The patient is concerned because she would like to leave tomorrow and head to [place] so I discussed the patient's risk factors and was receptive to any questions. The patient would like some time to think if she wants to stay the night.    9:35 PM - Patient seen at bedside and would like to be admitted.    9:37 PM - Consulted with Dr. Cooper, [facility], who is aware of the patient and agrees to admit.    Decision Making:  This is a 82 y.o. year old female who presents with chest pain.  The patient here has heart score of 3 , and normal troponin.  The patient however I believe would likely benefit from an inpatient admission for further provocative testing, and troponin trending.  Given this, the patient will be admitted to the [facility] internal medicine team for further evaluation and treatment.    DISPOSITION:  Patient will be admitted to Dr. Cooper, [facility] Internal Medicine, in guarded condition.    FINAL IMPRESSION  1.  Chest pain, other      Edward MASON (Scribe), am scribing for, and in the presence of, Sagar Wilkinson M.D..    Electronically signed by: Edward Hernandez (Marjoriee), 8/30/2017    Sagar MASON M.D. personally performed the services described in this documentation, as scribed by Edward Hernandez in my presence, and it is both accurate and complete.    The note accurately reflects work and decisions made by me.  Sagar Wilkinson  8/31/2017  1:53 AM

## 2017-08-31 NOTE — SENIOR ADMIT NOTE
"[facility] INTERNAL MEDICINE SENIOR ADMIT NOTE:      Patient ID:   Name:             William Vidal   YOB: 1935  Age:                 82 y.o.  female   MRN:               [id number]                                                          Chief Complaint:       Chest pain     History of Present Illness:    Mr. Vidal is a 82 y.o. female with a PMHx of recurrent UTIs, HTN, GERD and CKD III, who presents with atypical chest pain without significant cardiac history. Pain is non-exertional, started as central and then progressed to diffuse, lasted more than an hour, not pleuritic in nature, with no associated dyspnea, palpitation, nausea, vomiting or diaphoresis.   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 05/07/2013   • GERD (gastroesophageal reflux disease)    • Hypertension      Resolved Ambulatory Problems     Diagnosis Date Noted   • No Resolved Ambulatory Problems     Past Medical History:   Diagnosis Date   • Arrhythmia    • Autoimmune hepatitis (CMS-HCC)    • CATARACT    • Chronic UTI (urinary tract infection)    • DJD (degenerative joint disease), lumbosacral    • Gallbladder problem    • GERD (gastroesophageal reflux disease)    • GOUT    • Heart valve disease    • Hypertension    • Incontinence of urine    • Irritable bowel    • Other specified disorder of intestines    • Psychiatric problem        PHYSICAL EXAM  Vitals:   Weight/BMI: Body mass index is 25.54 kg/m².  Blood pressure (!) 167/72, pulse 67, temperature 36.6 °C (97.8 °F), resp. rate 15, height 1.626 m (5' 4\"), weight 67.5 kg (148 lb 13 oz), last menstrual period 10/10/1995, SpO2 96 " "%.  Vitals:    08/31/17 0056 08/31/17 0201 08/31/17 0212 08/31/17 0216   BP:   (!) 184/74 (!) 167/72   Pulse: 60 64 72 67   Resp: (!) 27 (!) 24 15    Temp:   36.6 °C (97.8 °F)    SpO2: 96% 95% 96%    Weight:   67.5 kg (148 lb 13 oz)    Height:   1.626 m (5' 4\")      Oxygen Therapy:  Pulse Oximetry: 96 %, O2 (LPM): 0, O2 Delivery: None (Room Air)      ASSESSMENT:  - Atypical chest pain: less likely to be cardiac, low suspicion for PE  - Elevated lipase   - Diarrhea: Antibiotic associated   - GERD  - CKD     PLAN:  - Admit as obs   - Trend EKG and troponin   - HbA1c, lipid panel and TSH  - Start aspirin and atorvastatin   - Continue home meds   - NPO at mid-night  - IV fluids therapy   - Stress test in the AM  - Repeat lipase   - Primary team to consider Echo and D.dimer or V/Q scan if needed     Please refer to Interns Dr. Mckeon H&P for complete admission details.      "

## 2017-08-31 NOTE — DISCHARGE PLANNING
Pt's sister can pick the pt up if she is DC'd today, if not pt's car is parked at the [facility].

## 2017-08-31 NOTE — PROGRESS NOTES
Report received, assumed patient care.  Pt A&OX4.  Assessment completed.  Call light within reach, personal belongings available, bed in lowest position, pt calling for assistance.  Pt reports no chest pain.  Communication board updated, POC discussed.  Monitors reapplied, VSS.  No additional needs at this time.

## 2017-08-31 NOTE — ED NOTES
Protocol initiated, blood sent to lab.  [facility]  Explained triage process to pt, told to notify ED tech or triage RN of any changes, verbalized understanding.

## 2017-09-06 PROBLEM — R07.9 CHEST PAIN: Status: RESOLVED | Noted: 2017-08-30 | Resolved: 2017-09-06

## 2017-09-06 PROBLEM — N18.30 STAGE 3 CHRONIC KIDNEY DISEASE: Status: ACTIVE | Noted: 2017-08-31

## 2017-09-06 PROBLEM — K75.4 AUTOIMMUNE HEPATITIS (HCC): Status: ACTIVE | Noted: 2017-09-06

## 2017-09-06 PROBLEM — R74.8 ELEVATED LIPASE: Status: RESOLVED | Noted: 2017-08-31 | Resolved: 2017-09-06

## 2017-09-06 PROBLEM — R73.02 IMPAIRED GLUCOSE TOLERANCE: Status: ACTIVE | Noted: 2017-09-06

## 2017-09-06 PROBLEM — Z90.49 HISTORY OF CHOLECYSTECTOMY: Status: ACTIVE | Noted: 2017-09-06

## 2017-10-11 PROBLEM — T46.4X5A COUGH DUE TO ACE INHIBITOR: Status: ACTIVE | Noted: 2017-10-11

## 2017-10-11 PROBLEM — R05.8 COUGH DUE TO ACE INHIBITOR: Status: ACTIVE | Noted: 2017-10-11

## 2019-03-19 PROBLEM — M19.041 OSTEOARTHRITIS OF HANDS, BILATERAL: Status: ACTIVE | Noted: 2019-03-19

## 2019-03-19 PROBLEM — M19.042 OSTEOARTHRITIS OF HANDS, BILATERAL: Status: ACTIVE | Noted: 2019-03-19

## 2021-11-02 ENCOUNTER — TELEPHONE (OUTPATIENT)
Dept: CARDIOLOGY | Facility: MEDICAL CENTER | Age: 86
End: 2021-11-02

## 2021-11-02 NOTE — TELEPHONE ENCOUNTER
Chart Prep  S/W Pt in regards to requesting records for NP appt with Dr. Fernandes. Pt has not seen a cardiologist for over 10 years now. She notified me these appointments were always just for annual check-ups. Pt has not had any recent cardiac testing done outside of [facility]. Labs are most recent from June 2021. Pt verbally confirmed time, date and location of appointment.

## 2021-11-04 ENCOUNTER — OFFICE VISIT (OUTPATIENT)
Dept: CARDIOLOGY | Facility: MEDICAL CENTER | Age: 86
End: 2021-11-04
Attending: FAMILY MEDICINE
Payer: MEDICARE

## 2021-11-04 VITALS
SYSTOLIC BLOOD PRESSURE: 130 MMHG | OXYGEN SATURATION: 96 % | DIASTOLIC BLOOD PRESSURE: 80 MMHG | HEIGHT: 63 IN | RESPIRATION RATE: 18 BRPM | BODY MASS INDEX: 25.34 KG/M2 | WEIGHT: 143 LBS | HEART RATE: 76 BPM

## 2021-11-04 DIAGNOSIS — I34.0 MILD MITRAL REGURGITATION: Chronic | ICD-10-CM

## 2021-11-04 DIAGNOSIS — I10 PRIMARY HYPERTENSION: ICD-10-CM

## 2021-11-04 DIAGNOSIS — E78.5 DYSLIPIDEMIA: Chronic | ICD-10-CM

## 2021-11-04 DIAGNOSIS — I49.3 PVC (PREMATURE VENTRICULAR CONTRACTION): Chronic | ICD-10-CM

## 2021-11-04 DIAGNOSIS — I35.1 MILD AORTIC REGURGITATION: Chronic | ICD-10-CM

## 2021-11-04 DIAGNOSIS — I44.7 INCOMPLETE LEFT BUNDLE BRANCH BLOCK (LBBB): Chronic | ICD-10-CM

## 2021-11-04 DIAGNOSIS — I49.8 OTHER CARDIAC ARRHYTHMIA: Primary | ICD-10-CM

## 2021-11-04 PROCEDURE — 99204 OFFICE O/P NEW MOD 45 MIN: CPT | Performed by: INTERNAL MEDICINE

## 2021-11-04 RX ORDER — NALTREXONE HYDROCHLORIDE 50 MG/1
50 TABLET, FILM COATED ORAL DAILY
COMMUNITY
End: 2023-07-10

## 2021-11-04 RX ORDER — ESOMEPRAZOLE MAGNESIUM 40 MG/1
CAPSULE, DELAYED RELEASE ORAL
COMMUNITY
Start: 2021-10-12

## 2021-11-04 RX ORDER — TRIAMCINOLONE ACETONIDE 1 MG/G
CREAM TOPICAL PRN
COMMUNITY
Start: 2021-10-19 | End: 2022-07-07

## 2021-11-04 ASSESSMENT — FIBROSIS 4 INDEX: FIB4 SCORE: 1.95

## 2021-11-04 NOTE — PROGRESS NOTES
Chief Complaint   Patient presents with   • Arrhythmia   • HTN (Controlled)       Subjective     William Vidal is a 86 y.o. female who presents today for consultation request by Susi Garrett D.O. for      Evaluation of her history of arrhythmia and mild valvular disease he has been doing well tolerating medications well no significant heart concerns or symptoms. She denies any angina heart failure symptoms rare palpitations nothing alarming    Past Medical History:   Diagnosis Date   • Allergy    • Arrhythmia     occ skipped beat   • Autoimmune hepatitis (HCC)     Luis Miguel   • CATARACT     surgical IOL   • Chronic UTI (urinary tract infection)     Raciel on abx suppression   • DJD (degenerative joint disease), lumbosacral     Hood   • Dyslipidemia    • Gallbladder problem    • GERD (gastroesophageal reflux disease)    • GOUT    • Heart valve disease    • Hyperlipidemia    • Hypertension    • Incontinence of urine     Iyer   • Irritable bowel    • Mild aortic regurgitation    • Mild mitral regurgitation    • Other specified disorder of intestines     diarrhea   • Psychiatric problem     anxiety   • PVC (premature ventricular contraction)    • Urinary tract infection      Past Surgical History:   Procedure Laterality Date   • DILATION AND CURETTAGE  5/7/2013    Performed by Chris Schrader M.D. at SURGERY SAME DAY [facility] ORS   • BLADDER SUSPENSION  2008    x 3 june, august, december   • ABDOMINAL HYSTERECTOMY TOTAL  7/81    partial   • APPENDECTOMY  1942   • CHOLECYSTECTOMY     • FINGER AMPUTATION      Finger tip   • SHOULDER DECOMPRESSION ARTHROSCOPIC      left     Family History   Problem Relation Age of Onset   • Alcohol/Drug Mother    • Alcohol/Drug Father    • Heart Disease Father    • Heart Attack Other    • Stroke Other    • Asthma Other    • Clotting Disorder Other    • Cancer Other      Social History     Socioeconomic History   • Marital status:      Spouse name: Not on file    • Number of children: Not on file   • Years of education: Not on file   • Highest education level: Not on file   Occupational History   • Not on file   Tobacco Use   • Smoking status: Former Smoker     Quit date: 3/14/1983     Years since quittin.6   • Smokeless tobacco: Never Used   Vaping Use   • Vaping Use: Never used   Substance and Sexual Activity   • Alcohol use: No     Alcohol/week: 0.0 oz   • Drug use: No   • Sexual activity: Not on file   Other Topics Concern   • Not on file   Social History Narrative   • Not on file     Social Determinants of Health     Financial Resource Strain:    • Difficulty of Paying Living Expenses:    Food Insecurity:    • Worried About Running Out of Food in the Last Year:    • Ran Out of Food in the Last Year:    Transportation Needs:    • Lack of Transportation (Medical):    • Lack of Transportation (Non-Medical):    Physical Activity:    • Days of Exercise per Week:    • Minutes of Exercise per Session:    Stress:    • Feeling of Stress :    Social Connections:    • Frequency of Communication with Friends and Family:    • Frequency of Social Gatherings with Friends and Family:    • Attends L[employer] Services:    • Active Member of Clubs or Organizations:    • Attends Club or Organization Meetings:    • Marital Status:    Intimate Partner Violence:    • Fear of Current or Ex-Partner:    • Emotionally Abused:    • Physically Abused:    • Sexually Abused:      Allergies   Allergen Reactions   • Augmentin    • Cipro Xr    • Imuran [Azathioprine]    • Macrobid [Nitrofurantoin Monohydrate Macrocrystals]    • Pyridium Plus [Phenazopyridine-Butabarb-Hyosc]    • Sulfa Drugs    • Trimethoprim    • Zetia [Ezetimibe]      Outpatient Encounter Medications as of 2021   Medication Sig Dispense Refill   • esomeprazole (NEXIUM) 40 MG delayed-release capsule      • triamcinolone acetonide (KENALOG) 0.1 % Cream Apply  topically as needed.     • naltrexone (DEPADE) 50 MG Tab Take 50 mg  "by mouth every day.     • colestipol (COLESTID) 1 GM Tab Take 1 tablet by mouth every day. 90 tablet 1   • amLODIPine (NORVASC) 2.5 MG Tab TAKE 1 TABLET BY MOUTH IN  THE MORNING 90 tablet 3   • losartan (COZAAR) 25 MG Tab TAKE 1 TABLET BY MOUTH  TWICE DAILY 180 tablet 3   • amitriptyline (ELAVIL) 10 MG Tab      • Non Formulary Request 1,000 mg by Apply externally route 2 Times a Day.     • aspirin EC 81 MG EC tablet Take 1 Tab by mouth every day. 30 Tab 0   • multivitamin (THERAGRAN) Tab Take 1 Tab by mouth every day.     • [DISCONTINUED] Lifitegrast (XIIDRA) 5 % Solution Administer 1 Drop into affected eye(s) 2 (two) times a day.     • [DISCONTINUED] Esomeprazole Magnesium (NEXIUM) 20 MG Pack Take 20 mg by mouth every morning before breakfast. (Patient not taking: Reported on 11/4/2021)       No facility-administered encounter medications on file as of 11/4/2021.     Review of Systems   Constitutional: Negative for chills and fever.   HENT: Negative for sore throat.    Eyes: Negative for blurred vision.   Respiratory: Negative for cough and shortness of breath.    Cardiovascular: Negative for chest pain, palpitations, claudication, leg swelling and PND.   Gastrointestinal: Negative for abdominal pain and nausea.   Musculoskeletal: Negative for falls and joint pain.   Skin: Negative for rash.   Neurological: Negative for dizziness, focal weakness and weakness.   Endo/Heme/Allergies: Does not bruise/bleed easily.              Objective     /80   Pulse 76   Resp 18   Ht 1.6 m (5' 3\")   Wt 64.9 kg (143 lb)   LMP 10/10/1995   SpO2 96%   BMI 25.33 kg/m²     Physical Exam  Constitutional:       General: She is not in acute distress.     Appearance: She is not diaphoretic.   Eyes:      General: No scleral icterus.  Neck:      Vascular: No JVD.   Cardiovascular:      Rate and Rhythm: Normal rate.      Heart sounds: Normal heart sounds. No murmur heard.   No friction rub. No gallop.    Pulmonary:      Effort: No " respiratory distress.      Breath sounds: No wheezing or rales.   Abdominal:      General: Bowel sounds are normal.      Palpations: Abdomen is soft.   Skin:     Findings: No rash.   Neurological:      Mental Status: She is alert.            We reviewed in person the most recent labs  Recent Results (from the past 5040 hour(s))   TSH    Collection Time: 06/14/21 12:53 PM   Result Value Ref Range    TSH 1.02 0.36 - 3.74 uIU/mL   VITAMIN D,25 HYDROXY    Collection Time: 06/14/21 12:53 PM   Result Value Ref Range    25-Hydroxy   Vitamin D 25 28 (L) 30 - 100 ng/mL   Lipid Profile    Collection Time: 06/14/21 12:53 PM   Result Value Ref Range    Cholesterol,Tot 205 (H) 120 - 200 mg/dL    Triglycerides 115 0 - 150 mg/dL     (H) <100 mg/dL    HDL 57.0 40.0 - 60.0 mg/dL    Chol-Hdl Ratio 3.60     Non HDL Cholesterol 148 30 - 160   Comp Metabolic Panel    Collection Time: 06/14/21 12:53 PM   Result Value Ref Range    Sodium 138 136 - 145 mmol/L    Potassium 4.2 3.5 - 5.1 mmol/L    Chloride 106 98 - 107 mmol/L    Co2 22 21 - 32 mmol/L    Anion Gap 14 10 - 18 mmol/L    Glucose 93 74 - 99 mg/dL    Bun 19 (H) 7 - 18 mg/dL    Creatinine 1.1 (H) 0.6 - 1.0 mg/dL    Calcium 8.9 8.5 - 11.0 mg/dL    AST(SGOT) 22 15 - 37 U/L    ALT(SGPT) 19 12 - 78 U/L    Alkaline Phosphatase 73 46 - 116 U/L    Total Bilirubin 0.6 0.2 - 1.0 mg/dL    Albumin 3.9 3.4 - 5.0 g/dL    Total Protein 7.5 6.4 - 8.2 g/dL    A-G Ratio 1.1    CBC WITHOUT DIFFERENTIAL    Collection Time: 06/14/21 12:53 PM   Result Value Ref Range    WBC 6.1 4.8 - 10.8 K/uL    RBC 4.85 4.20 - 5.40 M/uL    Hemoglobin 14.8 13.0 - 17.0 g/dL    Hematocrit 44.4 39.0 - 50.0 %    MCV 91.5 81.0 - 99.0 fL    MCH 30.5 27.0 - 31.0 pg    MCHC 33.3 33.0 - 37.0 g/dL    RDW 13.3 11.5 - 14.5 %    Platelet Count 217 130 - 400 K/uL    MPV 9.3 7.4 - 10.4 fL   ESTIMATED GFR    Collection Time: 06/14/21 12:53 PM   Result Value Ref Range    GFR If  57 (A) >60 mL/min/1.73 m 2    GFR  If Non  47 (A) >60 mL/min/1.73 m 2   VITAMIN B12    Collection Time: 21 12:53 PM   Result Value Ref Range    Vitamin B12 -True Cobalamin 460 193 - 986 pg/mL   FOLATE    Collection Time: 21 12:53 PM   Result Value Ref Range    Folate -Folic Acid 42.90 8.6 - 58.9 ng/mL   EKG    Collection Time: 21  1:24 PM   Result Value Ref Range    Report       [facility]    Test Date:  2021  Pt Name:    IVA MCCORMICK                Department: [facility]  MRN:        [id number]                      Room:  Gender:     Female                       Technician:   :        [id number]                   Requested By:TAMERA MARTÍNEZ  Order #:    [id number]                    Reading MD: Tamera Martínez MD    Measurements  Intervals                                Axis  Rate:       78                           P:          64  VA:         196                          QRS:        -67  QRSD:       112                          T:          88  QT:         400  QTc:        456    Interpretive Statements  SINUS RHYTHM  MULTIFORM VENTRICULAR PREMATURE COMPLEXES  INCOMPLETE LEFT BUNDLE BRANCH BLOCK  PROBABLE LEFT VENTRICULAR HYPERTROPHY  Compared to ECG 2017 02:54:58  NO SIGNIFICANT CHANGES  Electronically Signed On 2021 11:37:25 PDT by Tamera Martínez MD           Assessment & Plan     1. Other cardiac arrhythmia  EKG   2. Primary hypertension     3. PVC (premature ventricular contraction)     4. Dyslipidemia     5. Mild aortic regurgitation     6. Mild mitral regurgitation         Medical Decision Making: Today's Assessment/Status/Plan:        It was my pleasure to meet with Ms. Mccormick.    We addressed the management of hypertension at today's visit. Blood pressure is well controlled.  We specifically assessed the labs on hypertension treatment    Given her age does not require statin    Valvular disease not likely to progress    Low threshold to reach out for  arrhythmias otherwise follow-up as needed    It is my pleasure to participate in the care of Ms. Vidal.  Please do not hesitate to contact me with questions or concerns.    Willie Fernandes MD PhD [facility]  Cardiologist [facility]    Please note that this dictation was created using voice recognition software. There may be errors I did not discover before finalizing the note.     11/5/2021  3:24 PM

## 2021-11-04 NOTE — PATIENT INSTRUCTIONS
Work on at least 1.5 - 5 hours a week of moderate exercise (typical brisk walking or similar activity)    If you have had a heart attack, stent, bypass or reduced heart strength (EF <35%): cardiac rehab may reduce your risk of dying by 13-24% and need to go to the hospital by 30% within the first year (1)    Please look into the following diets and incorporate them into your diet    LOW SALT DIET   KEEP YOUR SODIUM EQUAL TO CALORIES AND NO MORE THAN DOUBLE THE CALORIES FOR A LOW SALT DIET    [employer] - great resource for [employer] on heart disease prevention and treatment    FOR TREATMENT OR PREVENTION OF CORONARY ARTERY DISEASE  These three programs are approved by Medicare/Insurers for those with heart disease  [place] [facility]  Dr. Montiel[facility] for Reversing Heart Disease - Meliton Watson's Cardiologist vegetarian-based  [facility] - [place]-based mind-body Program    This is a commonly referenced Program  Dr Christiansen - [facility] [place] (book and documentary) - vegetarian-based    FOR TREATMENT OF BLOOD PRESSURE  DASH DIET - American Heart Association for treatment of HYPERTENSION    FOR TREATMENT OF BAD CHOLESTEROL/FATS  REDUCE PROCESSED SUGAR AS MUCH AS POSSIBLE  INCREASE WHOLE GRAINS/VEGETABLES  INCREASE FIBER    Lowering total cholesterol and LDL (bad) cholesterol:  - Eat leaner cuts of meat, or eliminate altogether if possible red meat, and frequently substitute fish or chicken.  - Limit saturated fat to no more than 7-10% of total calories no more than 10 g per day is recommended. Some sources of saturated fat include butter, animal fats, hydrogenated vegetable fats and oils, many desserts, whole milk dairy products.  - Replaced saturated fats with polyunsaturated fats and monounsaturated fats. Foods high in monounsaturated fat include nuts, canola oil, avocados, and olives.  - Limit trans fat (processed foods)  and replaced with fresh fruits and vegetables  - Recommend nonfat dairy products  - Increase substantially the amount of soluble fiber intake (legumes such as beans, fruit, whole grains).  - Consider nutritional supplements: plant sterile spreads such as Benecol, fish oil,  flaxseed oil, omega-3 acids capsules 1000 mg twice a day, or viscous fiber such as Metamucil  - Attain ideal weight and regular exercise (at least 30 minutes per day of moderate exercise)  ASK ABOUT STATIN OR NON STATIN MEDICATION TO REDUCE YOUR LDL AND HEART RISK    Lowering triglycerides:  - Reduce intake of simple sugar: Desserts, candy, pastries, honey, sodas, sugared cereals, yogurt, Gatorade, sports bars, canned fruit, smoothies, fruit juice, coffee drinks  - Reduced intake of refined starches: Refined Pasta, most bread  - Reduce or abstain from alcohol  - Increase omega-3 fatty acids: [place], Trout, Mackerel, [place], [place] tuna and supplements  - Attain ideal weight and regular exercise (at least 30 minutes per day of moderate exercise)  ASK ABOUT PURIFIED OMEGA 3 EPA or FISH OIL TO REDUCE YOUR TG AND HEART RISK    Elevating HDL (good) cholesterol:  - Increase physical activity  - Increase omega-3 fatty acids and supplements as listed above  - Incorporating appropriate amounts of monounsaturated fats such as nuts, olive oil, canola oil, avocados, olives  - Stop smoking  - Attain ideal weight and regular exercise (at least 30 minutes per day of moderate exercise)

## 2021-11-05 LAB — EKG IMPRESSION: NORMAL

## 2021-11-05 PROCEDURE — 93000 ELECTROCARDIOGRAM COMPLETE: CPT | Performed by: INTERNAL MEDICINE

## 2021-11-05 ASSESSMENT — ENCOUNTER SYMPTOMS
BLURRED VISION: 0
WEAKNESS: 0
NAUSEA: 0
BRUISES/BLEEDS EASILY: 0
PALPITATIONS: 0
CLAUDICATION: 0
FALLS: 0
PND: 0
ABDOMINAL PAIN: 0
CHILLS: 0
DIZZINESS: 0
FOCAL WEAKNESS: 0
COUGH: 0
SORE THROAT: 0
FEVER: 0
SHORTNESS OF BREATH: 0

## 2024-05-17 ENCOUNTER — TELEPHONE (OUTPATIENT)
Dept: CARDIOLOGY | Facility: MEDICAL CENTER | Age: 89
End: 2024-05-17
Payer: MEDICARE

## 2024-05-17 NOTE — TELEPHONE ENCOUNTER
Called pt to discuss if any symptoms during VT episodes. She said no symptoms however she is still dealing with a pain under her breast and around her chest that she had discussed previuosly with Dr. Weinstein. She said a stress test and ECHO were completed. She is unsure if she needs further follow up or if she should be seen in our office. Advised will reach out to CW for her heart monitor read and results will be relayed to Dr. Weinstein who can provide her with further recommendations. Advised if CW has recommendations, will notify her as well. She verbalized understanding and was appreciative.    To CW: pt LOV Nov 2021, please read monitor and advise. Thank you!

## 2024-05-17 NOTE — TELEPHONE ENCOUNTER
Urgent ZIO EOS to 's nurse, Lillian, on 5/17/2024  Notified for VT  Monitor noted:  2 runs of VT,  with an avg rate of 127 BPM  17 runs of SVT,  with an avg rate of 124 BPM  Sinus Rhythm,  with an avg rate of 67 BPM  3 patient events,  SR 75  SVE(s)  VE(s)    Last office visit 11/4/21

## 2025-01-17 ENCOUNTER — HOSPITAL ENCOUNTER (OUTPATIENT)
Facility: MEDICAL CENTER | Age: 89
End: 2025-01-17
Attending: OTOLARYNGOLOGY
Payer: MEDICARE

## 2025-01-17 LAB
GRAM STN SPEC: NORMAL
SIGNIFICANT IND 70042: NORMAL
SITE SITE: NORMAL
SOURCE SOURCE: NORMAL

## 2025-01-17 PROCEDURE — 87070 CULTURE OTHR SPECIMN AEROBIC: CPT

## 2025-01-17 PROCEDURE — 87075 CULTR BACTERIA EXCEPT BLOOD: CPT

## 2025-01-17 PROCEDURE — 87205 SMEAR GRAM STAIN: CPT

## 2025-01-20 LAB
BACTERIA WND AEROBE CULT: NORMAL
GRAM STN SPEC: NORMAL
SIGNIFICANT IND 70042: NORMAL
SITE SITE: NORMAL
SOURCE SOURCE: NORMAL

## 2025-01-21 LAB
BACTERIA SPEC ANAEROBE CULT: NORMAL
SIGNIFICANT IND 70042: NORMAL
SITE SITE: NORMAL
SOURCE SOURCE: NORMAL

## 2025-06-17 ENCOUNTER — HOSPITAL ENCOUNTER (OUTPATIENT)
Facility: MEDICAL CENTER | Age: 89
End: 2025-06-17
Attending: OTOLARYNGOLOGY
Payer: MEDICARE

## 2025-06-17 PROCEDURE — 87070 CULTURE OTHR SPECIMN AEROBIC: CPT

## 2025-06-17 PROCEDURE — 87075 CULTR BACTERIA EXCEPT BLOOD: CPT

## 2025-06-17 PROCEDURE — 87205 SMEAR GRAM STAIN: CPT

## 2025-06-17 PROCEDURE — 87076 CULTURE ANAEROBE IDENT EACH: CPT

## 2025-06-18 LAB
GRAM STN SPEC: NORMAL
SIGNIFICANT IND 70042: NORMAL
SITE SITE: NORMAL
SOURCE SOURCE: NORMAL

## 2025-06-21 LAB
BACTERIA SPEC ANAEROBE CULT: ABNORMAL
BACTERIA SPEC ANAEROBE CULT: ABNORMAL
SIGNIFICANT IND 70042: ABNORMAL
SITE SITE: ABNORMAL
SOURCE SOURCE: ABNORMAL

## 2025-08-04 ENCOUNTER — HOSPITAL ENCOUNTER (OUTPATIENT)
Facility: MEDICAL CENTER | Age: 89
End: 2025-08-04
Attending: OTOLARYNGOLOGY
Payer: MEDICARE

## 2025-08-04 PROCEDURE — 87205 SMEAR GRAM STAIN: CPT

## 2025-08-04 PROCEDURE — 87075 CULTR BACTERIA EXCEPT BLOOD: CPT

## 2025-08-04 PROCEDURE — 87070 CULTURE OTHR SPECIMN AEROBIC: CPT

## 2025-08-05 LAB
GRAM STN SPEC: NORMAL
SIGNIFICANT IND 70042: NORMAL
SITE SITE: NORMAL
SOURCE SOURCE: NORMAL
